# Patient Record
Sex: MALE | URBAN - METROPOLITAN AREA
[De-identification: names, ages, dates, MRNs, and addresses within clinical notes are randomized per-mention and may not be internally consistent; named-entity substitution may affect disease eponyms.]

---

## 2019-01-11 ENCOUNTER — OFFICE VISIT (OUTPATIENT)
Dept: URBAN - METROPOLITAN AREA CLINIC 29 | Facility: CLINIC | Age: 54
End: 2019-01-11
Payer: MEDICAID

## 2019-01-11 PROCEDURE — 92004 COMPRE OPH EXAM NEW PT 1/>: CPT | Performed by: OPTOMETRIST

## 2019-01-11 ASSESSMENT — INTRAOCULAR PRESSURE
OS: 8
OD: 10

## 2019-01-11 NOTE — IMPRESSION/PLAN
Impression: Type 2 diab w severe nonprlf diabetic rtnop w macular edema, bilateral: e11.3413. Plan: Discussed diagnosis in detail with patient. Discussed treatment options with patient. Consult recommended [[facility]]. Discussed risks of progression. Call if [address] worsens.

## 2019-01-28 ENCOUNTER — OFFICE VISIT (OUTPATIENT)
Dept: URBAN - METROPOLITAN AREA CLINIC 33 | Facility: CLINIC | Age: 54
End: 2019-01-28
Payer: MEDICAID

## 2019-01-28 DIAGNOSIS — E11.3513 TYPE 2 DIABETES MELLITUS W/ PROLIFERATIVE DIABETIC RETINOPATHY W/ MACULAR EDEMA, BILATERAL: Primary | ICD-10-CM

## 2019-01-28 PROCEDURE — 92134 CPTRZ OPH DX IMG PST SGM RTA: CPT | Performed by: OPHTHALMOLOGY

## 2019-01-28 PROCEDURE — 92014 COMPRE OPH EXAM EST PT 1/>: CPT | Performed by: OPHTHALMOLOGY

## 2019-01-28 RX ORDER — PREDNISOLONE ACETATE 10 MG/ML
1 % SUSPENSION/ DROPS OPHTHALMIC
Qty: 10 | Refills: 5 | Status: INACTIVE
Start: 2019-01-28 | End: 2019-02-07

## 2019-01-28 RX ORDER — OFLOXACIN 3 MG/ML
0.3 % SOLUTION/ DROPS OPHTHALMIC
Qty: 5 | Refills: 3 | Status: INACTIVE
Start: 2019-01-28 | End: 2019-02-07

## 2019-01-28 ASSESSMENT — INTRAOCULAR PRESSURE
OS: 10
OD: 10

## 2019-01-28 NOTE — IMPRESSION/PLAN
Impression: Vitreous hemorrhage, bilateral associated with DM: H43.13. OU. Condition: unstable. Vision: vision affected. Plan: Discussed diagnosis in detail with patient.  Recommend surgery OU - OD first.

## 2019-01-28 NOTE — IMPRESSION/PLAN
Impression: Type 2 diabetes mellitus w/ proliferative diabetic retinopathy w/ macular edema, bilateral - SEVERE: F10.4883. OU. Condition: unstable. Vision: vision affected. Plan: Discussed diagnosis in detail with patient. Discussed risks of progression. Explained condition is SEVERE OU. Surgical treatment is recommended in BOTH EYES (ONE EYE AT A TIME)  in order to remove the blood for possible vision improvement PPVx. May need to use Gas or S. O. to repair the retina. If Gas is used: no traveling, flying or high altitude for 6 - 8 wks, if S.O. is used patient will need additional surgery in the future once the retina is stable to remove the S. O. Unable to tell how much vision will be recovered. Recommend to proceed with surgery OD FIRST. Surgical risks and benefits were discussed, explained and understood by patient. All questions answered. RL1. Educational material provided to patient. Erx'd Prednisolone and Ofloxacin to the pharmacy. Patient understands that additional KOSTAS treatments or laser treatments may be needed in the future. OCT shows macular edema with peripapillary traction OD, severe Fibrovascular with traction and edema OS. Will proceed with surgery OS once the right eye is stable.

## 2019-01-30 ENCOUNTER — SURGERY (OUTPATIENT)
Dept: URBAN - METROPOLITAN AREA SURGERY 15 | Facility: SURGERY | Age: 54
End: 2019-01-30
Payer: MEDICAID

## 2019-01-30 PROCEDURE — 67113 REPAIR RETINAL DETACH CPLX: CPT | Performed by: OPHTHALMOLOGY

## 2019-01-31 ENCOUNTER — POST-OPERATIVE VISIT (OUTPATIENT)
Dept: URBAN - METROPOLITAN AREA CLINIC 33 | Facility: CLINIC | Age: 54
End: 2019-01-31

## 2019-01-31 PROCEDURE — 99024 POSTOP FOLLOW-UP VISIT: CPT | Performed by: OPTOMETRIST

## 2019-01-31 ASSESSMENT — INTRAOCULAR PRESSURE
OS: 15
OD: 19

## 2019-02-07 ENCOUNTER — POST-OPERATIVE VISIT (OUTPATIENT)
Dept: URBAN - METROPOLITAN AREA CLINIC 33 | Facility: CLINIC | Age: 54
End: 2019-02-07

## 2019-02-07 DIAGNOSIS — H40.041 STEROID RESPONDER, RIGHT EYE: ICD-10-CM

## 2019-02-07 PROCEDURE — 99024 POSTOP FOLLOW-UP VISIT: CPT | Performed by: OPHTHALMOLOGY

## 2019-02-07 RX ORDER — BRIMONIDINE TARTRATE 1.5 MG/ML
0.15 % SOLUTION/ DROPS OPHTHALMIC
Qty: 5 | Refills: 5 | Status: INACTIVE
Start: 2019-02-07 | End: 2019-02-11

## 2019-02-07 RX ORDER — FLUOROMETHOLONE 2.5 MG/ML
0.25 % SUSPENSION/ DROPS OPHTHALMIC
Qty: 10 | Refills: 5 | Status: INACTIVE
Start: 2019-02-07 | End: 2019-03-08

## 2019-02-07 RX ORDER — BRIMONIDINE TARTRATE, TIMOLOL MALEATE 2; 5 MG/ML; MG/ML
SOLUTION/ DROPS OPHTHALMIC
Qty: 5 | Refills: 5 | Status: INACTIVE
Start: 2019-02-07 | End: 2019-02-11

## 2019-02-07 ASSESSMENT — INTRAOCULAR PRESSURE
OS: 12
OD: 34

## 2019-02-25 ENCOUNTER — POST-OPERATIVE VISIT (OUTPATIENT)
Dept: URBAN - METROPOLITAN AREA CLINIC 33 | Facility: CLINIC | Age: 54
End: 2019-02-25

## 2019-02-25 PROCEDURE — 99024 POSTOP FOLLOW-UP VISIT: CPT | Performed by: OPHTHALMOLOGY

## 2019-02-25 RX ORDER — OFLOXACIN 3 MG/ML
0.3 % SOLUTION/ DROPS OPHTHALMIC
Qty: 5 | Refills: 3 | Status: INACTIVE
Start: 2019-02-25 | End: 2019-03-21

## 2019-02-25 RX ORDER — LOTEPREDNOL ETABONATE 5 MG/ML
0.5 % SUSPENSION/ DROPS OPHTHALMIC
Qty: 5 | Refills: 5 | Status: INACTIVE
Start: 2019-02-25 | End: 2019-03-05

## 2019-02-25 ASSESSMENT — INTRAOCULAR PRESSURE
OD: 32
OS: 16

## 2019-02-27 ENCOUNTER — SURGERY (OUTPATIENT)
Dept: URBAN - METROPOLITAN AREA SURGERY 15 | Facility: SURGERY | Age: 54
End: 2019-02-27
Payer: MEDICAID

## 2019-02-27 PROCEDURE — 67113 REPAIR RETINAL DETACH CPLX: CPT | Performed by: OPHTHALMOLOGY

## 2019-02-28 ENCOUNTER — POST-OPERATIVE VISIT (OUTPATIENT)
Dept: URBAN - METROPOLITAN AREA CLINIC 16 | Facility: CLINIC | Age: 54
End: 2019-02-28

## 2019-02-28 PROCEDURE — 99024 POSTOP FOLLOW-UP VISIT: CPT | Performed by: OPTOMETRIST

## 2019-02-28 ASSESSMENT — INTRAOCULAR PRESSURE
OS: 17
OD: 16
OS: 16

## 2019-03-21 ENCOUNTER — POST-OPERATIVE VISIT (OUTPATIENT)
Dept: URBAN - METROPOLITAN AREA CLINIC 33 | Facility: CLINIC | Age: 54
End: 2019-03-21

## 2019-03-21 PROCEDURE — 99024 POSTOP FOLLOW-UP VISIT: CPT | Performed by: OPHTHALMOLOGY

## 2019-03-21 ASSESSMENT — INTRAOCULAR PRESSURE
OD: 17
OS: 17

## 2019-04-22 ENCOUNTER — POST-OPERATIVE VISIT (OUTPATIENT)
Dept: URBAN - METROPOLITAN AREA CLINIC 33 | Facility: CLINIC | Age: 54
End: 2019-04-22

## 2019-04-22 PROCEDURE — 99024 POSTOP FOLLOW-UP VISIT: CPT | Performed by: OPHTHALMOLOGY

## 2019-04-22 ASSESSMENT — INTRAOCULAR PRESSURE
OD: 16
OS: 16

## 2019-05-30 ENCOUNTER — OFFICE VISIT (OUTPATIENT)
Dept: URBAN - METROPOLITAN AREA CLINIC 33 | Facility: CLINIC | Age: 54
End: 2019-05-30
Payer: MEDICAID

## 2019-05-30 DIAGNOSIS — H33.023 RETINAL DETACHMENT WITH MULTIPLE BREAKS, BILATERAL: Primary | ICD-10-CM

## 2019-05-30 DIAGNOSIS — E11.3533 TYPE 2 DIABETES MELLITUS WITH PROLIFERATIVE DIABETIC RETINOPATHY WITH TRACTION RETINAL DETACHMENT NOT INVOLVING THE MACULA, BILATERAL: ICD-10-CM

## 2019-05-30 PROCEDURE — 92014 COMPRE OPH EXAM EST PT 1/>: CPT | Performed by: OPHTHALMOLOGY

## 2019-05-30 PROCEDURE — 92134 CPTRZ OPH DX IMG PST SGM RTA: CPT | Performed by: OPHTHALMOLOGY

## 2019-05-30 RX ORDER — OFLOXACIN 3 MG/ML
0.3 % SOLUTION/ DROPS OPHTHALMIC
Qty: 5 | Refills: 3 | Status: INACTIVE
Start: 2019-05-30 | End: 2019-05-30

## 2019-05-30 RX ORDER — FLUOROMETHOLONE 2.5 MG/ML
0.25 % SUSPENSION/ DROPS OPHTHALMIC
Qty: 10 | Refills: 5 | Status: INACTIVE
Start: 2019-05-30 | End: 2019-05-30

## 2019-05-30 ASSESSMENT — INTRAOCULAR PRESSURE
OD: 11
OS: 10

## 2019-05-30 NOTE — IMPRESSION/PLAN
Impression: Type 2 diabetes mellitus with proliferative diabetic retinopathy with traction retinal detachment not involving the macula, bilateral: C31.9853. Condition: stabilizing OU. Vision: vision affected. s/p PPVX for Repair of Retinal Detachment OD with S. O. 01/30/2019 
s/p PPVX for Repair of Retinal Detachment OS with S. O. 02/27/2019 Plan: Discussed diagnosis in detail with patient. Discussed risks of progression. Recommend surgery OD and observation OS - see notes above.

## 2019-05-30 NOTE — IMPRESSION/PLAN
Impression: Retinal detachment with multiple breaks, bilateral: H33.023. OU. Condition: stable post retina surgery OU. Vision: vision affected. s/p PPVX for Repair of Retinal Detachment OD with S. O. 01/30/2019 associated with PDR w/TRD OD.
s/p PPVX for Repair of Retinal Detachment OS with S. O. 02/27/2019 associated with PDR w/TRD OS. Plan: Discussed diagnosis in detail with patient. Exam shows the retina is attached and stable with S. Barrios Bard  shows a decrease in edema and retina appears attached OU. At this time recommend additional surgery for the Right Eye to remove the S. O. for possible vision improvement. Discussed surgery with risks and benefits. All questions answered. Patient elects to proceed with recommendation. RL1. Erx'd FML Forte o.25% and Ofloxacin to the pharmacy. Patient should continue using Timolol OD TID, Dorzolamide OD TID and Brimonidine OD TID. Will consider surgery OS in the future, continue using FML 0.25% OS TID.

## 2019-06-05 ENCOUNTER — SURGERY (OUTPATIENT)
Dept: URBAN - METROPOLITAN AREA SURGERY 15 | Facility: SURGERY | Age: 54
End: 2019-06-05
Payer: MEDICAID

## 2019-06-05 PROCEDURE — 67040 LASER TREATMENT OF RETINA: CPT | Performed by: OPHTHALMOLOGY

## 2019-06-06 ENCOUNTER — POST-OPERATIVE VISIT (OUTPATIENT)
Dept: URBAN - METROPOLITAN AREA CLINIC 16 | Facility: CLINIC | Age: 54
End: 2019-06-06

## 2019-06-06 PROCEDURE — 99024 POSTOP FOLLOW-UP VISIT: CPT | Performed by: OPTOMETRIST

## 2019-06-06 ASSESSMENT — INTRAOCULAR PRESSURE
OS: 16
OD: 9

## 2019-06-13 ENCOUNTER — POST-OPERATIVE VISIT (OUTPATIENT)
Dept: URBAN - METROPOLITAN AREA CLINIC 33 | Facility: CLINIC | Age: 54
End: 2019-06-13
Payer: MEDICAID

## 2019-06-13 PROCEDURE — 99024 POSTOP FOLLOW-UP VISIT: CPT | Performed by: OPHTHALMOLOGY

## 2019-06-13 ASSESSMENT — INTRAOCULAR PRESSURE
OS: 10
OD: 9

## 2019-07-11 ENCOUNTER — POST-OPERATIVE VISIT (OUTPATIENT)
Dept: URBAN - METROPOLITAN AREA CLINIC 33 | Facility: CLINIC | Age: 54
End: 2019-07-11

## 2019-07-11 PROCEDURE — 99024 POSTOP FOLLOW-UP VISIT: CPT | Performed by: OPHTHALMOLOGY

## 2019-07-11 ASSESSMENT — INTRAOCULAR PRESSURE
OS: 18
OD: 18

## 2019-09-05 ENCOUNTER — OFFICE VISIT (OUTPATIENT)
Dept: URBAN - METROPOLITAN AREA CLINIC 33 | Facility: CLINIC | Age: 54
End: 2019-09-05
Payer: MEDICAID

## 2019-09-05 DIAGNOSIS — H43.312 VITREOUS MEMBRANES AND STRANDS, LEFT EYE: ICD-10-CM

## 2019-09-05 DIAGNOSIS — H25.812 COMBINED FORMS OF AGE-RELATED CATARACT, LEFT EYE: ICD-10-CM

## 2019-09-05 PROCEDURE — 92014 COMPRE OPH EXAM EST PT 1/>: CPT | Performed by: OPHTHALMOLOGY

## 2019-09-05 PROCEDURE — 92134 CPTRZ OPH DX IMG PST SGM RTA: CPT | Performed by: OPHTHALMOLOGY

## 2019-09-05 RX ORDER — OFLOXACIN 3 MG/ML
0.3 % SOLUTION/ DROPS OPHTHALMIC
Qty: 5 | Refills: 3 | Status: INACTIVE
Start: 2019-09-05 | End: 2019-09-17

## 2019-09-05 ASSESSMENT — INTRAOCULAR PRESSURE
OS: 9
OD: 16

## 2019-09-05 NOTE — IMPRESSION/PLAN
Impression: Type 2 diabetes mellitus with proliferative diabetic retinopathy with traction retinal detachment involving the macula, bilateral: G69.4241. Condition: stable. Vision: vision affected. s/p PPVX for Removal of S. O. OD 06/05/2019.
s/p PPVX for Repair of Retinal Detachment OD with S. O. 01/30/2019 
s/p PPVX for Repair of Retinal Detachment OS with S. O. 02/27/2019 Plan: Discussed diagnosis in detail with patient. Discussed risks of progression. Exam OS shows the retina is attached and stable with S. O. Recommend S. O, removal at this time, will remove the Cataract and proceed with Lens Implant LEFT EYE for possible vision improvement. Advised patient for the possibility of laser tx, AFX, Long acting gas, Perfl or S.O. based on findings during surgery. Discussed surgery in great detail with risks and benefits. All questions answered. Patient elects to proceed with recommendation. RL1. Patient has FML refills, Erx  Ofloxacin to pt's pharmacy. OCT shows a decrease in CMT OS and the retina is attached OS. Recommend to continue using Timolol OD TID, Dorzolamide OD TID, Brimonidine OD TID, FML 0.25% OD TID and Fluorometholone 0.1% OS. Exam OD shows the retina is attached and stable. Recommend observation for now. OCT OD shows a decrease in CMT.

## 2019-09-05 NOTE — IMPRESSION/PLAN
Impression: Combined forms of age-related cataract, left eye: H25.812  OS. Condition: worsening. Vision: vision affected. Plan: Discussed diagnosis in detail with patient. Recommend removal of Cataract and a Lens Implant OS while performing retina surgery to remove the S. O. - see notes above.

## 2019-09-05 NOTE — IMPRESSION/PLAN
Impression: Vitreous membranes and strands, left eye: H43.312 OS. Condition: unstable. Vision: vision affected. Plan: Discussed diagnosis in detail with patient. Recommend surgery OS to remove the S. O. - see notes above.

## 2019-09-09 ENCOUNTER — TESTING ONLY (OUTPATIENT)
Dept: URBAN - METROPOLITAN AREA CLINIC 33 | Facility: CLINIC | Age: 54
End: 2019-09-09
Payer: MEDICAID

## 2019-09-09 PROCEDURE — 92136 OPHTHALMIC BIOMETRY: CPT | Performed by: OPHTHALMOLOGY

## 2019-09-11 ENCOUNTER — SURGERY (OUTPATIENT)
Dept: URBAN - METROPOLITAN AREA SURGERY 15 | Facility: SURGERY | Age: 54
End: 2019-09-11
Payer: MEDICAID

## 2019-09-11 PROCEDURE — 67041 VIT FOR MACULAR PUCKER: CPT | Performed by: OPHTHALMOLOGY

## 2019-09-11 RX ORDER — PROCHLORPERAZINE 25 MG/1
25 MG SUPPOSITORY RECTAL
Qty: 3 | Refills: 0 | Status: INACTIVE
Start: 2019-09-11 | End: 2019-09-17

## 2019-09-11 RX ORDER — PROCHLORPERAZINE MALEATE 10 MG/1
10 MG TABLET, FILM COATED ORAL
Qty: 3 | Refills: 0 | Status: INACTIVE
Start: 2019-09-11 | End: 2019-09-17

## 2019-09-12 ENCOUNTER — POST-OPERATIVE VISIT (OUTPATIENT)
Dept: URBAN - METROPOLITAN AREA CLINIC 33 | Facility: CLINIC | Age: 54
End: 2019-09-12

## 2019-09-12 PROCEDURE — 99024 POSTOP FOLLOW-UP VISIT: CPT | Performed by: OPTOMETRIST

## 2019-09-12 ASSESSMENT — INTRAOCULAR PRESSURE
OD: 14
OS: 29

## 2019-09-17 ENCOUNTER — OFFICE VISIT (OUTPATIENT)
Dept: URBAN - METROPOLITAN AREA CLINIC 23 | Facility: CLINIC | Age: 54
End: 2019-09-17
Payer: MEDICAID

## 2019-09-17 PROCEDURE — 92014 COMPRE OPH EXAM EST PT 1/>: CPT | Performed by: OPTOMETRIST

## 2019-09-17 RX ORDER — OFLOXACIN 3 MG/ML
0.3 % SOLUTION/ DROPS OPHTHALMIC
Qty: 5 | Refills: 3 | Status: INACTIVE
Start: 2019-09-17 | End: 2020-01-31

## 2019-09-17 ASSESSMENT — INTRAOCULAR PRESSURE
OD: 20
OS: 20

## 2019-09-17 ASSESSMENT — KERATOMETRY: OD: 42.63

## 2019-09-17 NOTE — IMPRESSION/PLAN
Impression: Vitreous hemorrhage, right eye: H43.11. Plan: Discussed diagnosis in detail with patient. Discussed treatment options with patient. Advised patient of condition. Reassured patient of current condition and treatment. Will continue to observe condition and or symptoms. Consult recommended [[facility]].

## 2019-09-19 ENCOUNTER — POST-OPERATIVE VISIT (OUTPATIENT)
Dept: URBAN - METROPOLITAN AREA CLINIC 33 | Facility: CLINIC | Age: 54
End: 2019-09-19

## 2019-09-19 PROCEDURE — 99024 POSTOP FOLLOW-UP VISIT: CPT | Performed by: OPHTHALMOLOGY

## 2019-09-19 ASSESSMENT — INTRAOCULAR PRESSURE
OS: 16
OD: 15

## 2019-10-03 ENCOUNTER — POST-OPERATIVE VISIT (OUTPATIENT)
Dept: URBAN - METROPOLITAN AREA CLINIC 33 | Facility: CLINIC | Age: 54
End: 2019-10-03

## 2019-10-03 PROCEDURE — 99024 POSTOP FOLLOW-UP VISIT: CPT | Performed by: OPHTHALMOLOGY

## 2019-10-03 ASSESSMENT — INTRAOCULAR PRESSURE
OS: 20
OD: 22

## 2019-10-09 ENCOUNTER — SURGERY (OUTPATIENT)
Dept: URBAN - METROPOLITAN AREA SURGERY 15 | Facility: SURGERY | Age: 54
End: 2019-10-09
Payer: MEDICAID

## 2019-10-09 PROCEDURE — 66821 AFTER CATARACT LASER SURGERY: CPT | Performed by: OPHTHALMOLOGY

## 2019-10-17 ENCOUNTER — OFFICE VISIT (OUTPATIENT)
Dept: URBAN - METROPOLITAN AREA CLINIC 33 | Facility: CLINIC | Age: 54
End: 2019-10-17
Payer: MEDICAID

## 2019-10-17 DIAGNOSIS — H21.1X2: ICD-10-CM

## 2019-10-17 DIAGNOSIS — H43.12 VITREOUS HEMORRHAGE, LEFT EYE: ICD-10-CM

## 2019-10-17 DIAGNOSIS — E11.3522 TYPE 2 DIAB W PROLIF DIAB RTNOP W TRCTN DTCH MACULA, L EYE: ICD-10-CM

## 2019-10-17 PROCEDURE — 67028 INJECTION EYE DRUG: CPT | Performed by: OPHTHALMOLOGY

## 2019-10-17 PROCEDURE — 92134 CPTRZ OPH DX IMG PST SGM RTA: CPT | Performed by: OPHTHALMOLOGY

## 2019-10-17 PROCEDURE — 92014 COMPRE OPH EXAM EST PT 1/>: CPT | Performed by: OPHTHALMOLOGY

## 2019-10-17 ASSESSMENT — INTRAOCULAR PRESSURE
OD: 18
OS: 19

## 2019-10-17 NOTE — IMPRESSION/PLAN
Impression: Type 2 diabetes mellitus with proliferative diabetic retinopathy with traction retinal detachment involving the macula, bilateral: W48.9635. Condition: stable OD, unstable OS. Vision: vision affected. s/p PPVX for Removal of S. O. OD 06/05/2019.
s/p PPVX for Repair of Retinal Detachment OD with S. O. 01/30/2019 
s/p PPVX for Repair of Retinal Detachment OS with S. O. 02/27/2019
s/p PPVX, ERMx OD 06/05/19
s/p PPVX removal of silicone Oil OS 73/26/5732 Plan: Discussed diagnosis in detail with patient. Discussed risks of progression. Based on today's exam, diagnostic studies and review of records, recommend to continue with KOSTAS tx to help reduce the bleeding and slow down new blood vessel growth in order to prevent a further reduction in vision. An examination that was significantly and separately identifiable from the procedure was performed today. Discussed the risks and benefits of tx. Patient elects to proceed with recommendation. OCT unable to obtain OS Recommend for patient to continue Timolol TID OU and Brimonidine TID OU until gone then d/c and Continue: FML TID OS until further directed. Exam OD shows the retina is attached and stable. Recommend observation for now. OCT OD shows a decrease in CMT.

## 2019-10-17 NOTE — IMPRESSION/PLAN
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 OS. Condition: unstable. Plan: Discussed diagnosis in detail with patient. Discussed treatment options with patient. Recommend AV OS, see above notes.

## 2019-10-17 NOTE — IMPRESSION/PLAN
Impression: Vitreous hemorrhage, left eye: H43.12. OS. Condition: unstable. Plan: Discussed diagnosis in detail with patient. Discussed treatment options with patient. Recommend AV OS, see above notes.

## 2019-12-02 ENCOUNTER — OFFICE VISIT (OUTPATIENT)
Dept: URBAN - METROPOLITAN AREA CLINIC 33 | Facility: CLINIC | Age: 54
End: 2019-12-02
Payer: MEDICAID

## 2019-12-02 PROCEDURE — 92014 COMPRE OPH EXAM EST PT 1/>: CPT | Performed by: OPHTHALMOLOGY

## 2019-12-02 PROCEDURE — 92134 CPTRZ OPH DX IMG PST SGM RTA: CPT | Performed by: OPHTHALMOLOGY

## 2019-12-02 ASSESSMENT — INTRAOCULAR PRESSURE
OS: 20
OD: 22

## 2019-12-02 NOTE — IMPRESSION/PLAN
Impression: Vitreous hemorrhage, left eye: H43.12. OS. Condition: stable. Plan: Discussed diagnosis in detail with patient. Exam OS shows resolved VH. Recommend observation for now.

## 2019-12-02 NOTE — IMPRESSION/PLAN
Impression: Combined forms of age-related cataract of right eye: H25.811. OD. Vision: vision affected. Plan: Discussed diagnosis in detail with patient. Recommend a Cataract evaluation for consideration of surgery.

## 2019-12-02 NOTE — IMPRESSION/PLAN
Impression: Type 2 diabetes mellitus with proliferative diabetic retinopathy with traction retinal detachment involving the macula, bilateral: W86.7119. Condition: stable OU. Vision: vision affected but improving.
s/p AV OS#1  10/17/2019
s/p PPVX for Removal of S. O. OD 06/05/2019.
s/p PPVX for Repair of Retinal Detachment OD with S. O. 01/30/2019 
s/p PPVX for Repair of Retinal Detachment OS with S. O. 02/27/2019
s/p PPVX, ERMx OD 06/05/19
s/p PPVX removal of silicone Oil OS 90/29/0449 Plan: Discussed diagnosis in detail with patient. Exam OS shows resolved VH OS post KOSTAS tx w/Avastin. No additional treatment recommended at this time. Will continue to monitor condition. OCT OS shows mild edema, retina is attached. Recommend for patient to continue Timolol TID OU and Brimonidine TID OU until gone then d/c and Continue: FML TID OS until further directed. Exam OD shows the retina is attached and stable. Recommend observation for now. OCT OD shows retina is attached and stable. Recommend a Cataract evaluation for consideration of surgery OD.

## 2019-12-17 ENCOUNTER — OFFICE VISIT (OUTPATIENT)
Dept: URBAN - METROPOLITAN AREA CLINIC 33 | Facility: CLINIC | Age: 54
End: 2019-12-17
Payer: MEDICAID

## 2019-12-17 DIAGNOSIS — H25.811 COMBINED FORMS OF AGE-RELATED CATARACT OF RIGHT EYE: Primary | ICD-10-CM

## 2019-12-17 DIAGNOSIS — E11.3413 TYPE 2 DIAB W SEVERE NONPRLF DIABETIC RTNOP W MACULAR EDEMA, BILATERAL: ICD-10-CM

## 2019-12-17 PROCEDURE — 99214 OFFICE O/P EST MOD 30 MIN: CPT | Performed by: OPHTHALMOLOGY

## 2019-12-17 RX ORDER — DUREZOL 0.5 MG/ML
0.05 % EMULSION OPHTHALMIC
Qty: 1 | Refills: 2 | Status: INACTIVE
Start: 2019-12-17 | End: 2020-02-21

## 2019-12-17 RX ORDER — MOXIFLOXACIN HYDROCHLORIDE 5 MG/ML
0.5 % SOLUTION/ DROPS OPHTHALMIC
Qty: 1 | Refills: 1 | Status: INACTIVE
Start: 2019-12-17 | End: 2020-02-21

## 2019-12-17 ASSESSMENT — INTRAOCULAR PRESSURE
OS: 20
OD: 19

## 2019-12-17 ASSESSMENT — KERATOMETRY
OD: 43.13
OS: 42.75

## 2019-12-17 ASSESSMENT — VISUAL ACUITY
OD: 20/200
OS: LP

## 2019-12-17 NOTE — IMPRESSION/PLAN
Impression: Type 2 diab w severe nonprlf diabetic rtnop w macular edema, bilateral: N31.0734. Plan: S/p multiple surgeries under care of Dr. Bibi Means. Patient has sudden decrease in vision OS. VA stable. Silicone oil noted in anterior chamber. Discussed patient should f/u with Dr. Bibi Means. First day patient is able to come back is on Friday so will arrange for in [facility]. Advised to call sooner PRN onset of new symptoms.

## 2019-12-17 NOTE — IMPRESSION/PLAN
Impression: Combined forms of age-related cataract of right eye: H25.811 OD. Plan: Cataracts account for the patient's complaints. Discussed all risks, benefits, procedures and recovery. Patient understands changing glasses will not improve vision. Patient desires to have surgery, recommend phacoemulsification with intraocular lens. RE only
target plano RL2
discussed that patient is high risk for need for additional surgeries since he has had multiple surgeries. Cataracts account for some decreased vision, however, the patient is aware with macular changes that level of vision post operatively cannot be determined. Discussed risks, benefits, procedures and recovery. Patient desires surgery, recommend phacoemulsification with intraocular lens.

## 2019-12-20 ENCOUNTER — OFFICE VISIT (OUTPATIENT)
Dept: URBAN - METROPOLITAN AREA CLINIC 23 | Facility: CLINIC | Age: 54
End: 2019-12-20
Payer: MEDICAID

## 2019-12-20 DIAGNOSIS — E11.3512 TYPE 2 DIABETES MELLITUS W/ PROLIFERATIVE DIABETIC RETINOPATHY W/ MACULAR EDEMA, LEFT EYE: ICD-10-CM

## 2019-12-20 DIAGNOSIS — H40.89 OTHER SPECIFIED GLAUCOMA: ICD-10-CM

## 2019-12-20 PROCEDURE — 92014 COMPRE OPH EXAM EST PT 1/>: CPT | Performed by: OPHTHALMOLOGY

## 2019-12-20 PROCEDURE — 92134 CPTRZ OPH DX IMG PST SGM RTA: CPT | Performed by: OPHTHALMOLOGY

## 2019-12-20 PROCEDURE — 67028 INJECTION EYE DRUG: CPT | Performed by: OPHTHALMOLOGY

## 2019-12-20 ASSESSMENT — INTRAOCULAR PRESSURE
OD: 21
OS: 29

## 2019-12-20 NOTE — IMPRESSION/PLAN
Impression: Type 2 diabetes mellitus with proliferative diabetic retinopathy with traction retinal detachment involving the macula, bilateral: O01.7846. Condition: stable OU. Vision: vision affected but improving.
s/p AV OS#1  10/17/2019
s/p 25g PPVx, ERMx, So removal, LLX, PCIOL AO +19.0 OS 9/11/2019
s/p PPVx, EL, AFX, CLARIBEL, AV OD 6/5/2019
s/p PPVx, EL, AFX, SO, AV OS 2/27/2019
s/p PPvx, ERMx, EL, SO, Claribel, AV OD 1/30/2019
s/p PPVX for Removal of S. O. OD 06/05/2019.
s/p PPVX for Repair of Retinal Detachment OD with S. O. 01/30/2019 
s/p PPVX for Repair of Retinal Detachment OS with S. O. 02/27/2019
s/p PPVX, ERMx OD 06/05/19
s/p PPVX removal of silicone Oil OS 15/16/7341 Plan: Exam of the left eye shows neovascularization associated with PDR. Discussed diagnosis in detail with patient. Discussed risks of progression. Based on today's exam, diagnostic studies and review of records, recommend to continue with KOSTAS tx LEFT EYE to control the bleeding and control new blood vessel growth in order to prevent a further reduction in vision. Discussed risks/benefits of injection. All questions answered. Patient elects to proceed with recommendation. OCT shows  scarring, folding OS Continue using Brimonidine TID OU and Timolol TID OU, and schedule consult with Glaucoma. Exam today of the left eye also shows a loose suture, removed suture in office with slit lamp exam, 1 drop of proparacaine instilled in the left eye, jewelers used to remove suture without complications. Exam and OCT of the right eye shows a stable macula no active edema. No treatment is required, recommend close observation. Will reassess in 4-6 wks. Patient understands that additional KOSTAS treatments or laser treatments may be needed in the future.

## 2019-12-20 NOTE — IMPRESSION/PLAN
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 OS. Condition: unstable. Vision: vision affected. Plan: Discussed diagnosis in detail with patient. Discussed risks of progression. Recommend an KOSTAS Treatment of Avastin.  See notes above

## 2019-12-20 NOTE — IMPRESSION/PLAN
Impression: Type 2 diabetes mellitus w/ proliferative diabetic retinopathy w/ macular edema, left eye: L19.3176. OS. Condition: unstable. Vision: vision affected. Plan: Advised patient of condition. Discussed diagnosis in detail with patient.  See notes above

## 2019-12-23 ENCOUNTER — TESTING ONLY (OUTPATIENT)
Dept: URBAN - METROPOLITAN AREA CLINIC 33 | Facility: CLINIC | Age: 54
End: 2019-12-23
Payer: MEDICAID

## 2019-12-23 PROCEDURE — 92136 OPHTHALMIC BIOMETRY: CPT | Performed by: OPHTHALMOLOGY

## 2019-12-31 ENCOUNTER — SURGERY (OUTPATIENT)
Dept: URBAN - METROPOLITAN AREA SURGERY 15 | Facility: SURGERY | Age: 54
End: 2019-12-31
Payer: MEDICAID

## 2019-12-31 PROCEDURE — 66982 XCAPSL CTRC RMVL CPLX WO ECP: CPT | Performed by: OPHTHALMOLOGY

## 2020-01-01 ENCOUNTER — POST-OPERATIVE VISIT (OUTPATIENT)
Dept: URBAN - METROPOLITAN AREA CLINIC 33 | Facility: CLINIC | Age: 55
End: 2020-01-01

## 2020-01-01 PROCEDURE — 99024 POSTOP FOLLOW-UP VISIT: CPT | Performed by: OPTOMETRIST

## 2020-01-01 ASSESSMENT — INTRAOCULAR PRESSURE
OD: 14
OS: 18

## 2020-01-21 ENCOUNTER — POST-OPERATIVE VISIT (OUTPATIENT)
Dept: URBAN - METROPOLITAN AREA CLINIC 33 | Facility: CLINIC | Age: 55
End: 2020-01-21

## 2020-01-21 PROCEDURE — 99024 POSTOP FOLLOW-UP VISIT: CPT | Performed by: OPTOMETRIST

## 2020-01-21 ASSESSMENT — INTRAOCULAR PRESSURE
OD: 24
OS: 20
OS: 14

## 2020-01-21 ASSESSMENT — VISUAL ACUITY: OD: 20/200

## 2020-01-31 ENCOUNTER — OFFICE VISIT (OUTPATIENT)
Dept: URBAN - METROPOLITAN AREA CLINIC 23 | Facility: CLINIC | Age: 55
End: 2020-01-31
Payer: MEDICAID

## 2020-01-31 DIAGNOSIS — E11.3523 TYPE 2 DIABETES MELLITUS WITH PROLIFERATIVE DIABETIC RETINOPATHY WITH TRACTION RETINAL DETACHMENT INVOLVING THE MACULA, BILATERAL: Primary | ICD-10-CM

## 2020-01-31 DIAGNOSIS — H35.372 PUCKERING OF MACULA, LEFT EYE: ICD-10-CM

## 2020-01-31 PROCEDURE — 92134 CPTRZ OPH DX IMG PST SGM RTA: CPT | Performed by: OPHTHALMOLOGY

## 2020-01-31 PROCEDURE — 92014 COMPRE OPH EXAM EST PT 1/>: CPT | Performed by: OPHTHALMOLOGY

## 2020-01-31 RX ORDER — OFLOXACIN 3 MG/ML
0.3 % SOLUTION/ DROPS OPHTHALMIC
Qty: 5 | Refills: 3 | Status: INACTIVE
Start: 2020-01-31 | End: 2020-06-29

## 2020-01-31 RX ORDER — OFLOXACIN 3 MG/ML
0.3 % SOLUTION/ DROPS OPHTHALMIC
Qty: 5 | Refills: 3 | Status: INACTIVE
Start: 2020-01-31 | End: 2020-02-21

## 2020-01-31 RX ORDER — FLUOROMETHOLONE 2.5 MG/ML
0.25 % SUSPENSION/ DROPS OPHTHALMIC
Qty: 10 | Refills: 5 | Status: INACTIVE
Start: 2020-01-31 | End: 2020-02-21

## 2020-01-31 ASSESSMENT — INTRAOCULAR PRESSURE
OD: 22
OS: 20

## 2020-01-31 NOTE — IMPRESSION/PLAN
Impression: Other secondary cataract, right eye: H26.491. OD. Plan: Discussed diagnosis in detail with patient. May need Yag laser tx OD. Recommend to follow - up with Dr Vee Rebolledo.

## 2020-01-31 NOTE — IMPRESSION/PLAN
Impression: Puckering of macula, left eye: H35.372. OS. Condition: worsening. Vision: vision affected. Plan: Discussed diagnosis in detail with patient. Discussed risks of progression. Surgical treatment is recommended to remove scar tissue for vision improvement PPVx LEFT EYE. Surgical risks and benefits were discussed, explained and understood by patient. All questions answered. RL1. Patient elects to proceed with recommendation. OCT performed today: ERM. Educational material provided to patient. Erx FML and Ofloxacin to the pharmacy.

## 2020-01-31 NOTE — IMPRESSION/PLAN
Impression: Type 2 diabetes mellitus with proliferative diabetic retinopathy with traction retinal detachment involving the macula, bilateral: B68.5402. Condition: stable OU. Vision: vision affected but improving.
s/p AV OS 12/20/2019, AV OS#1  10/17/2019
s/p 25g PPVx, ERMx, So removal, LLX, PCIOL AO +19.0 OS 9/11/2019
s/p PPVx, EL, AFX, CLARIBEL, AV OD 6/5/2019
s/p PPVx, EL, AFX, SO, AV OS 2/27/2019
s/p PPvx, ERMx, EL, SO, Claribel, AV OD 1/30/2019
s/p PPVX for Removal of S. O. OD 06/05/2019.
s/p PPVX for Repair of Retinal Detachment OD with S. O. 01/30/2019 
s/p PPVX for Repair of Retinal Detachment OS with S. O. 02/27/2019
s/p PPVX, ERMx OD 06/05/19
s/p PPVX removal of silicone Oil OS 21/99/5331 Plan: Discussed diagnosis in detail with patient. Exam shows minimal diabetic changes. No treatment is required at this time. Will continue to observe condition and or symptoms. Emphasized blood sugar control.

## 2020-02-12 ENCOUNTER — SURGERY (OUTPATIENT)
Dept: URBAN - METROPOLITAN AREA SURGERY 15 | Facility: SURGERY | Age: 55
End: 2020-02-12
Payer: MEDICAID

## 2020-02-12 PROCEDURE — 67041 VIT FOR MACULAR PUCKER: CPT | Performed by: OPHTHALMOLOGY

## 2020-02-13 ENCOUNTER — POST-OPERATIVE VISIT (OUTPATIENT)
Dept: URBAN - METROPOLITAN AREA CLINIC 23 | Facility: CLINIC | Age: 55
End: 2020-02-13

## 2020-02-13 ASSESSMENT — INTRAOCULAR PRESSURE
OS: 16
OD: 16

## 2020-02-21 ENCOUNTER — POST-OPERATIVE VISIT (OUTPATIENT)
Dept: URBAN - METROPOLITAN AREA CLINIC 23 | Facility: CLINIC | Age: 55
End: 2020-02-21

## 2020-02-21 PROCEDURE — 99024 POSTOP FOLLOW-UP VISIT: CPT | Performed by: OPHTHALMOLOGY

## 2020-02-21 ASSESSMENT — INTRAOCULAR PRESSURE
OS: 18
OD: 18

## 2020-02-25 ENCOUNTER — OFFICE VISIT (OUTPATIENT)
Dept: URBAN - METROPOLITAN AREA CLINIC 33 | Facility: CLINIC | Age: 55
End: 2020-02-25
Payer: MEDICAID

## 2020-02-25 DIAGNOSIS — H26.491 OTHER SECONDARY CATARACT, RIGHT EYE: ICD-10-CM

## 2020-02-25 PROCEDURE — 99214 OFFICE O/P EST MOD 30 MIN: CPT | Performed by: OPHTHALMOLOGY

## 2020-02-25 ASSESSMENT — INTRAOCULAR PRESSURE
OD: 18
OS: 17

## 2020-02-25 NOTE — IMPRESSION/PLAN
Impression: Other secondary cataract, right eye: H26.491. OD. Plan: Posterior capsular opacification present on lens implant and causing decreased vision. Proceed with YAG capsulotomy treatment   OD- once DR ADELA WELCH Discussed R/B/I  with patient.

## 2020-02-25 NOTE — IMPRESSION/PLAN
Impression: Puckering of macula, left eye: H35.372 OS.  Plan: con't follow up care with Dr Dami Slade

## 2020-02-25 NOTE — IMPRESSION/PLAN
Impression: Vitreous hemorrhage, right eye: H43.11. Plan: OD:4+ cells in vitreous dense. Discussed diagnosis in detail with patient. Consult recommended [[facility]]---> see Dr Price Tracy for SX. Discussed findings with Dr. Price Tracy over the phone. He is OK to wait until next week for evaluation.

## 2020-02-25 NOTE — IMPRESSION/PLAN
Impression: Type 2 diabetes mellitus with proliferative diabetic retinopathy with traction retinal detachment involving the macula, bilateral: S84.1541. Condition: stable OU. Vision: vision affected but improving.
s/p AV OS 12/20/2019, AV OS#1  10/17/2019
s/p 25g PPVx, ERMx, So removal, LLX, PCIOL AO +19.0 OS 9/11/2019
s/p PPVx, EL, AFX, CLARIBEL, AV OD 6/5/2019
s/p PPVx, EL, AFX, SO, AV OS 2/27/2019
s/p PPvx, ERMx, EL, SO, Claribel, AV OD 1/30/2019
s/p PPVX for Removal of S. O. OD 06/05/2019.
s/p PPVX for Repair of Retinal Detachment OD with S. O. 01/30/2019 
s/p PPVX for Repair of Retinal Detachment OS with S. O. 02/27/2019
s/p PPVX, ERMx OD 06/05/19
s/p PPVX removal of silicone Oil OS 92/00/5288 Plan: Discussed diagnosis in detail with patient. Exam shows minimal diabetic changes. No treatment is required at this time. Will continue to observe condition and or symptoms. Emphasized blood sugar control.

## 2020-03-03 ENCOUNTER — OFFICE VISIT (OUTPATIENT)
Dept: URBAN - METROPOLITAN AREA CLINIC 23 | Facility: CLINIC | Age: 55
End: 2020-03-03
Payer: MEDICAID

## 2020-03-03 DIAGNOSIS — H43.813 VITREOUS DEGENERATION, BILATERAL: Primary | ICD-10-CM

## 2020-03-03 PROCEDURE — 92012 INTRM OPH EXAM EST PATIENT: CPT | Performed by: OPTOMETRIST

## 2020-03-03 ASSESSMENT — INTRAOCULAR PRESSURE
OS: 29
OD: 30
OD: 34
OS: 24

## 2020-03-03 NOTE — IMPRESSION/PLAN
Impression: Vitreous degeneration, bilateral: H43.813. OU. Plan: Discussed diagnosis in detail with patient. Discussed treatment options with patient. Reassured patient of current condition and treatment. Will continue to observe condition and or symptoms. Consult recommended [[facility]]. Patient instructed to call if condition gets worse. Sample of Combigan given to patient to bring down IOP OU.

## 2020-03-03 NOTE — IMPRESSION/PLAN
Impression: Type 2 diabetes mellitus w/ proliferative diabetic retinopathy w/ macular edema, bilateral: P11.9877 OU. Plan: Discussed diagnosis in detail with patient. Advised patient of condition. Discussed risks and benefits and patient understands. Emphasized blood sugar control. Discussed risks of progression. Reassured patient of current condition and treatment. Consult recommended with a [facility].

## 2020-03-04 ENCOUNTER — OFFICE VISIT (OUTPATIENT)
Dept: URBAN - METROPOLITAN AREA CLINIC 23 | Facility: CLINIC | Age: 55
End: 2020-03-04
Payer: MEDICAID

## 2020-03-04 PROCEDURE — 99213 OFFICE O/P EST LOW 20 MIN: CPT | Performed by: OPHTHALMOLOGY

## 2020-03-04 PROCEDURE — 92134 CPTRZ OPH DX IMG PST SGM RTA: CPT | Performed by: OPHTHALMOLOGY

## 2020-03-04 ASSESSMENT — INTRAOCULAR PRESSURE
OS: 32
OD: 30

## 2020-03-04 NOTE — IMPRESSION/PLAN
Impression: Vitreous hemorrhage, bilateral associated with PDR: H43.13. OU. Condition: unstable. Vision: vision affected. Plan: Discussed diagnosis in detail with patient. Discussed risks of progression. Recommend KOSTAS tx OU - see notes above.

## 2020-03-04 NOTE — IMPRESSION/PLAN
Impression: Type 2 diabetes mellitus w/ proliferative diabetic retinopathy w/o macular edema, bilateral: E57.2212. OU. Condition: unstable. Vision: vision affected. s/p PPPVX for Removal of ERM OS 02/12/2020
s/p AV OS 12/20/2019, AV OS#1  10/17/2019
s/p 25g PPVx, ERMx, So removal, LLX, PCIOL AO +19.0 OS 9/11/2019
s/p PPVx, EL, AFX, CLARIBEL, AV OD 6/5/2019
s/p PPVx, EL, AFX, SO, AV OS 2/27/2019
s/p PPvx, ERMx, EL, SO, Claribel, AV OD 1/30/2019
s/p PPVX for Removal of S. O. OD 06/05/2019.
s/p PPVX for Repair of Retinal Detachment OD with S. O. 01/30/2019 
s/p PPVX for Repair of Retinal Detachment OS with S. O. 02/27/2019
s/p PPVX, ERMx OD 06/05/19
s/p PPVX removal of silicone Oil OS 64/57/0420 Plan: Discussed diagnosis in detail with patient. Exam shows active Vitreous Hemorrhage in both eyes Discussed risks of progression. Recommend KOSTAS tx in both eyes to help reduce the bleeding and prevent a further reduction in vision. Discussed the risks and benefits of treatment. All questions answered. Patient elects to proceed with recommendation. Recommend tx tomorrow at [id number] [facility]. OCT unable to obtain OU.

## 2020-03-05 ENCOUNTER — PROCEDURE (OUTPATIENT)
Dept: URBAN - METROPOLITAN AREA CLINIC 33 | Facility: CLINIC | Age: 55
End: 2020-03-05
Payer: MEDICAID

## 2020-03-05 DIAGNOSIS — H43.13 VITREOUS HEMORRHAGE, BILATERAL: ICD-10-CM

## 2020-03-16 ENCOUNTER — POST-OPERATIVE VISIT (OUTPATIENT)
Dept: URBAN - METROPOLITAN AREA CLINIC 23 | Facility: CLINIC | Age: 55
End: 2020-03-16
Payer: MEDICAID

## 2020-03-16 DIAGNOSIS — Z09 ENCNTR FOR F/U EXAM AFT TRTMT FOR COND OTH THAN MALIG NEOPLM: Primary | ICD-10-CM

## 2020-03-16 PROCEDURE — 99024 POSTOP FOLLOW-UP VISIT: CPT | Performed by: OPHTHALMOLOGY

## 2020-03-16 ASSESSMENT — INTRAOCULAR PRESSURE
OD: 29
OS: 25

## 2020-04-01 ENCOUNTER — POST-OPERATIVE VISIT (OUTPATIENT)
Dept: URBAN - METROPOLITAN AREA CLINIC 23 | Facility: CLINIC | Age: 55
End: 2020-04-01

## 2020-04-01 PROCEDURE — 99024 POSTOP FOLLOW-UP VISIT: CPT | Performed by: OPHTHALMOLOGY

## 2020-04-01 RX ORDER — FLUOROMETHOLONE 1 MG/ML
0.1 % SUSPENSION/ DROPS OPHTHALMIC
Qty: 1 | Refills: 2 | Status: INACTIVE
Start: 2020-04-01 | End: 2020-06-25

## 2020-04-01 RX ORDER — OFLOXACIN 3 MG/ML
0.3 % SOLUTION/ DROPS OPHTHALMIC
Qty: 5 | Refills: 3 | Status: INACTIVE
Start: 2020-04-01 | End: 2020-06-25

## 2020-04-01 RX ORDER — FLUOROMETHOLONE 2.5 MG/ML
0.25 % SUSPENSION/ DROPS OPHTHALMIC
Qty: 10 | Refills: 5 | Status: INACTIVE
Start: 2020-04-01 | End: 2020-04-01

## 2020-04-01 ASSESSMENT — INTRAOCULAR PRESSURE
OS: 30
OD: 32

## 2020-04-08 ENCOUNTER — SURGERY (OUTPATIENT)
Dept: URBAN - METROPOLITAN AREA SURGERY 15 | Facility: SURGERY | Age: 55
End: 2020-04-08
Payer: MEDICAID

## 2020-04-08 PROCEDURE — 67041 VIT FOR MACULAR PUCKER: CPT | Performed by: OPHTHALMOLOGY

## 2020-04-09 ENCOUNTER — POST-OPERATIVE VISIT (OUTPATIENT)
Dept: URBAN - METROPOLITAN AREA CLINIC 23 | Facility: CLINIC | Age: 55
End: 2020-04-09

## 2020-04-09 ASSESSMENT — INTRAOCULAR PRESSURE
OD: 17
OS: 26

## 2020-04-13 ENCOUNTER — POST-OPERATIVE VISIT (OUTPATIENT)
Dept: URBAN - METROPOLITAN AREA CLINIC 23 | Facility: CLINIC | Age: 55
End: 2020-04-13

## 2020-04-13 PROCEDURE — 99024 POSTOP FOLLOW-UP VISIT: CPT | Performed by: OPHTHALMOLOGY

## 2020-04-13 ASSESSMENT — INTRAOCULAR PRESSURE
OD: 17
OS: 11

## 2020-04-16 ENCOUNTER — PROCEDURE (OUTPATIENT)
Dept: URBAN - METROPOLITAN AREA CLINIC 33 | Facility: CLINIC | Age: 55
End: 2020-04-16
Payer: MEDICAID

## 2020-04-16 DIAGNOSIS — E11.3591 TYPE 2 DIAB WITH PROLIF DIAB RTNOP WITHOUT MCLR EDEMA, R EYE: ICD-10-CM

## 2020-04-16 DIAGNOSIS — E11.3593 TYPE 2 DIAB WITH PROLIF DIAB RTNOP WITHOUT MACULAR EDEMA, BI: Primary | ICD-10-CM

## 2020-04-16 PROCEDURE — 67028 INJECTION EYE DRUG: CPT | Performed by: OPHTHALMOLOGY

## 2020-04-30 ENCOUNTER — POST-OPERATIVE VISIT (OUTPATIENT)
Dept: URBAN - METROPOLITAN AREA CLINIC 33 | Facility: CLINIC | Age: 55
End: 2020-04-30
Payer: MEDICAID

## 2020-04-30 PROCEDURE — 99024 POSTOP FOLLOW-UP VISIT: CPT | Performed by: OPHTHALMOLOGY

## 2020-04-30 ASSESSMENT — INTRAOCULAR PRESSURE
OD: 16
OS: 11

## 2020-06-12 ENCOUNTER — POST-OPERATIVE VISIT (OUTPATIENT)
Dept: URBAN - METROPOLITAN AREA CLINIC 23 | Facility: CLINIC | Age: 55
End: 2020-06-12
Payer: MEDICAID

## 2020-06-12 PROCEDURE — 99024 POSTOP FOLLOW-UP VISIT: CPT | Performed by: OPHTHALMOLOGY

## 2020-06-12 ASSESSMENT — INTRAOCULAR PRESSURE
OS: 3
OD: 10

## 2020-06-25 ENCOUNTER — POST-OPERATIVE VISIT (OUTPATIENT)
Dept: URBAN - METROPOLITAN AREA CLINIC 23 | Facility: CLINIC | Age: 55
End: 2020-06-25
Payer: MEDICAID

## 2020-06-25 DIAGNOSIS — H43.11 VITREOUS HEMORRHAGE, RIGHT EYE: Primary | ICD-10-CM

## 2020-06-25 PROCEDURE — 99213 OFFICE O/P EST LOW 20 MIN: CPT | Performed by: OPTOMETRIST

## 2020-06-25 ASSESSMENT — INTRAOCULAR PRESSURE
OD: 15
OS: 10

## 2020-06-25 NOTE — IMPRESSION/PLAN
Impression: Vitreous hemorrhage, right eye: H43.11. Plan: Discussed findings. Recommend consult with retinal specialist. Dr. Price Tracy 1 week or sooner.

## 2020-06-26 ENCOUNTER — POST-OPERATIVE VISIT (OUTPATIENT)
Dept: URBAN - METROPOLITAN AREA CLINIC 23 | Facility: CLINIC | Age: 55
End: 2020-06-26

## 2020-06-26 DIAGNOSIS — Z48.810 ENCNTR FOR SURGICAL AFTCR FOL SURGERY ON THE SENSE ORGANS: ICD-10-CM

## 2020-06-26 DIAGNOSIS — E11.3531 TYPE 2 DIABETES MELLITUS WITH PROLIFERATIVE DIABETIC RETINOPATHY WITH TRACTION RETINAL DETACHMENT NOT INVOLVING THE MACULA, RIGHT EYE: ICD-10-CM

## 2020-06-26 DIAGNOSIS — H43.311 VITREOUS MEMBRANES AND STRANDS, RIGHT EYE: ICD-10-CM

## 2020-06-26 PROCEDURE — 99024 POSTOP FOLLOW-UP VISIT: CPT | Performed by: OPHTHALMOLOGY

## 2020-06-26 RX ORDER — FLUOROMETHOLONE 2.5 MG/ML
0.25 % SUSPENSION/ DROPS OPHTHALMIC
Qty: 10 | Refills: 5 | Status: INACTIVE
Start: 2020-06-26 | End: 2020-06-26

## 2020-06-26 ASSESSMENT — INTRAOCULAR PRESSURE
OD: 13
OS: 12

## 2020-07-01 ENCOUNTER — SURGERY (OUTPATIENT)
Dept: URBAN - METROPOLITAN AREA SURGERY 15 | Facility: SURGERY | Age: 55
End: 2020-07-01
Payer: MEDICAID

## 2020-07-01 PROCEDURE — 67041 VIT FOR MACULAR PUCKER: CPT | Performed by: OPHTHALMOLOGY

## 2020-07-22 ENCOUNTER — POST-OPERATIVE VISIT (OUTPATIENT)
Dept: URBAN - METROPOLITAN AREA CLINIC 23 | Facility: CLINIC | Age: 55
End: 2020-07-22
Payer: MEDICAID

## 2020-07-22 PROCEDURE — 99024 POSTOP FOLLOW-UP VISIT: CPT | Performed by: OPHTHALMOLOGY

## 2020-07-22 ASSESSMENT — INTRAOCULAR PRESSURE
OD: 17
OS: 10

## 2020-10-15 ENCOUNTER — OFFICE VISIT (OUTPATIENT)
Dept: URBAN - METROPOLITAN AREA CLINIC 33 | Facility: CLINIC | Age: 55
End: 2020-10-15
Payer: MEDICAID

## 2020-10-15 PROCEDURE — 99213 OFFICE O/P EST LOW 20 MIN: CPT | Performed by: OPHTHALMOLOGY

## 2020-10-15 PROCEDURE — 92134 CPTRZ OPH DX IMG PST SGM RTA: CPT | Performed by: OPHTHALMOLOGY

## 2020-10-15 ASSESSMENT — INTRAOCULAR PRESSURE
OS: 19
OD: 18

## 2020-10-15 NOTE — IMPRESSION/PLAN
Impression: Type 2 diabetes mellitus w/ proliferative diabetic retinopathy w/o macular edema, bilateral: Q91.0251. OU. Condition: stable. Vision: vision affected. s/p PPVx, Claude Esquedaone OD 7/1/2020
s/p AV OD 4/16/2020. .. s/p PPVx,ERMx, EL, AV OS 4/8/2020
s/p PPPVX for Removal of ERM OS 02/12/2020
s/p AV OS 12/20/2019, AV OS#1  10/17/2019
s/p 25g PPVx, ERMx, So removal, LLX, PCIOL AO +19.0 OS 9/11/2019
s/p PPVx, EL, AFX, CLARIBEL, AV OD 6/5/2019
s/p PPVx, EL, AFX, SO, AV OS 2/27/2019
s/p PPvx, ERMx, EL, SO, Claribel, AV OD 1/30/2019
s/p PPVX for Removal of S. O. OD 06/05/2019.
s/p PPVX for Repair of Retinal Detachment OD with S. O. 01/30/2019 
s/p PPVX for Repair of Retinal Detachment OS with S. O. 02/27/2019
s/p PPVX, ERMx OD 06/05/19
s/p PPVX removal of silicone Oil OS 33/22/2552 Plan: Discussed diagnosis in detail with patient. Exam shows stable retina, no active edema no heme both eyes. No treatment is recommended at this time. Emphasized blood sugar control and advised to keep future appointments with PCP and/or Endocrinologist for the management of Diabetes. Recommend observation for now. OCT shows stable no active edema, clear view OU.

## 2021-02-18 ENCOUNTER — OFFICE VISIT (OUTPATIENT)
Dept: URBAN - METROPOLITAN AREA CLINIC 33 | Facility: CLINIC | Age: 56
End: 2021-02-18
Payer: MEDICAID

## 2021-02-18 PROCEDURE — 92012 INTRM OPH EXAM EST PATIENT: CPT | Performed by: OPHTHALMOLOGY

## 2021-02-18 PROCEDURE — 92134 CPTRZ OPH DX IMG PST SGM RTA: CPT | Performed by: OPHTHALMOLOGY

## 2021-02-18 ASSESSMENT — INTRAOCULAR PRESSURE
OS: 10
OD: 14

## 2021-02-18 NOTE — IMPRESSION/PLAN
Impression: Type 2 diabetes mellitus w/ proliferative diabetic retinopathy w/o macular edema, bilateral: Y39.7139. OU. Condition: stable. Vision: vision affected. s/p PPVx, Eva Marsh OD 7/1/2020
s/p AV OD 4/16/2020. .. s/p PPVx,ERMx, EL, AV OS 4/8/2020
s/p PPPVX for Removal of ERM OS 02/12/2020
s/p AV OS 12/20/2019, AV OS#1  10/17/2019
s/p 25g PPVx, ERMx, So removal, LLX, PCIOL AO +19.0 OS 9/11/2019
s/p PPVx, EL, AFX, CLARIBEL, AV OD 6/5/2019
s/p PPVx, EL, AFX, SO, AV OS 2/27/2019
s/p PPvx, ERMx, EL, SO, Claribel, AV OD 1/30/2019
s/p PPVX for Removal of S. O. OD 06/05/2019.
s/p PPVX for Repair of Retinal Detachment OD with S. O. 01/30/2019 
s/p PPVX for Repair of Retinal Detachment OS with S. O. 02/27/2019
s/p PPVX, ERMx OD 06/05/19
s/p PPVX removal of silicone Oil OS 23/15/3144 Plan: Discussed diagnosis in detail with patient. Exam shows the retina is attached and stable OU, no active edema, no heme in both eyes. No treatment is recommended at this time. Emphasized blood sugar control and advised to keep future appointments with PCP and/or Endocrinologist for the management of Diabetes. Recommend observation for now. OCT shows a clear view, stable no active edema OU. Consider a LVE with Dr Lucia Edmondson. Pt called regarding status of FMLA paperwork  Pt stated that the date he had the monitor on was 2/18/19  Pt called regarding status of [place] paperwork  Pt stated that the date he had the monitor on was 2/18/19